# Patient Record
Sex: FEMALE | Race: BLACK OR AFRICAN AMERICAN | Employment: UNEMPLOYED | URBAN - METROPOLITAN AREA
[De-identification: names, ages, dates, MRNs, and addresses within clinical notes are randomized per-mention and may not be internally consistent; named-entity substitution may affect disease eponyms.]

---

## 2018-04-13 LAB — HBA1C MFR BLD HPLC: 6.1 %

## 2018-11-09 LAB
CREAT ?TM UR-SCNC: 153 UMOL/L
EXT MICROALBUMIN URINE RANDOM: 250
HBA1C MFR BLD HPLC: 6.1 %
MICROALBUMIN/CREAT UR: 1634 MG/G{CREAT}

## 2018-11-15 ENCOUNTER — TELEPHONE (OUTPATIENT)
Dept: NEPHROLOGY | Facility: CLINIC | Age: 63
End: 2018-11-15

## 2018-11-15 NOTE — TELEPHONE ENCOUNTER
Called and left message on machine on 11/14 and 11/15/2018 for patient to return our call for a Nephrology Consult Appointment

## 2018-11-19 ENCOUNTER — TELEPHONE (OUTPATIENT)
Dept: NEPHROLOGY | Facility: CLINIC | Age: 63
End: 2018-11-19

## 2018-11-19 NOTE — TELEPHONE ENCOUNTER
Called and left a message on machine for patient to return our call for a Nephrology Consult Appointment

## 2018-11-29 ENCOUNTER — TELEPHONE (OUTPATIENT)
Dept: NEPHROLOGY | Facility: CLINIC | Age: 63
End: 2018-11-29

## 2018-11-29 NOTE — TELEPHONE ENCOUNTER
Called and left a message on machine 11/19 @ 2:07pm and 11/29/2018 @ 1:42pm for patient to return our call for a Nephrology Consult appointment  I did call Dr Nellie Ariza office and spoke to Bobby and Bobby said that a letter was mailed out to the patient about making an appointment with Nephrologist  Dr Elvira Esparza office aware of not being able to get a hold of the patient

## 2018-12-06 ENCOUNTER — TELEPHONE (OUTPATIENT)
Dept: NEPHROLOGY | Facility: CLINIC | Age: 63
End: 2018-12-06

## 2018-12-06 NOTE — TELEPHONE ENCOUNTER
Patient called into the office to speak to [place] [id number] she said she had some messages from her as I was transferring the patient to [place] [id number] she hung up  Rosaura tried calling her back 5 times 12/6/2018 no answer

## 2018-12-06 NOTE — TELEPHONE ENCOUNTER
Patient called and I offered her appointment with Dr Geoffrey Fajardo and Dr Mason Lagos, but patient refused appoinment during the Holiday Time, because she is in the middle of moving and is having family in for the [employer]  Patient wanted to wait until Dr Cuauhtemoc Mcadams came back from vacation  I told the patient that Dr Cuauhtemoc Mcadams won't be back until the end of January and the patient said that she wants to go into February

## 2018-12-07 ENCOUNTER — TRANSCRIBE ORDERS (OUTPATIENT)
Dept: NEPHROLOGY | Facility: CLINIC | Age: 63
End: 2018-12-07

## 2018-12-07 DIAGNOSIS — R80.9 PROTEINURIA, UNSPECIFIED TYPE: Primary | ICD-10-CM

## 2019-01-28 ENCOUNTER — TELEPHONE (OUTPATIENT)
Dept: NEPHROLOGY | Facility: CLINIC | Age: 64
End: 2019-01-28

## 2019-01-28 NOTE — TELEPHONE ENCOUNTER
LMOM for patient to call the office we need need to collect information from the patient  Home phone number [phone] is not in service

## 2019-02-08 ENCOUNTER — CONSULT (OUTPATIENT)
Dept: NEPHROLOGY | Facility: CLINIC | Age: 64
End: 2019-02-08
Payer: COMMERCIAL

## 2019-02-08 VITALS
HEART RATE: 68 BPM | DIASTOLIC BLOOD PRESSURE: 80 MMHG | RESPIRATION RATE: 16 BRPM | TEMPERATURE: 98.6 F | SYSTOLIC BLOOD PRESSURE: 146 MMHG | WEIGHT: 171.38 LBS | BODY MASS INDEX: 31.54 KG/M2 | HEIGHT: 62 IN

## 2019-02-08 DIAGNOSIS — K21.00 GASTRO-ESOPHAGEAL REFLUX DISEASE WITH ESOPHAGITIS: ICD-10-CM

## 2019-02-08 DIAGNOSIS — R80.9 PROTEINURIA, UNSPECIFIED TYPE: ICD-10-CM

## 2019-02-08 DIAGNOSIS — E11.9 TYPE 2 DIABETES MELLITUS WITH HEMOGLOBIN A1C GOAL OF LESS THAN 7.0% (HCC): Primary | ICD-10-CM

## 2019-02-08 DIAGNOSIS — I10 ESSENTIAL HYPERTENSION: ICD-10-CM

## 2019-02-08 DIAGNOSIS — E66.9 OBESITY, CLASS I, BMI 30.0-34.9 (SEE ACTUAL BMI): ICD-10-CM

## 2019-02-08 PROCEDURE — 99204 OFFICE O/P NEW MOD 45 MIN: CPT | Performed by: INTERNAL MEDICINE

## 2019-02-08 PROCEDURE — 3066F NEPHROPATHY DOC TX: CPT | Performed by: INTERNAL MEDICINE

## 2019-02-08 RX ORDER — FOLIC ACID/MULTIVIT,IRON,MINER .4-18-35
1 TABLET,CHEWABLE ORAL DAILY
COMMUNITY

## 2019-02-08 RX ORDER — LISINOPRIL 40 MG/1
20 TABLET ORAL DAILY
COMMUNITY

## 2019-02-08 RX ORDER — HYDROCHLOROTHIAZIDE 25 MG/1
25 TABLET ORAL DAILY
COMMUNITY

## 2019-02-08 RX ORDER — ASPIRIN 81 MG/1
81 TABLET ORAL DAILY
COMMUNITY

## 2019-02-08 RX ORDER — METFORMIN HYDROCHLORIDE 500 MG/1
500 TABLET, FILM COATED, EXTENDED RELEASE ORAL 2 TIMES DAILY WITH MEALS
COMMUNITY

## 2019-02-08 RX ORDER — METOPROLOL SUCCINATE 25 MG/1
25 TABLET, EXTENDED RELEASE ORAL DAILY
COMMUNITY

## 2019-02-08 RX ORDER — ESCITALOPRAM OXALATE 10 MG/1
5 TABLET ORAL DAILY
COMMUNITY

## 2019-02-08 RX ORDER — DICLOFENAC POTASSIUM 50 MG/1
50 TABLET, FILM COATED ORAL 3 TIMES DAILY
COMMUNITY

## 2019-02-08 RX ORDER — ATORVASTATIN CALCIUM 40 MG/1
40 TABLET, FILM COATED ORAL DAILY
COMMUNITY

## 2019-02-08 RX ORDER — LANOLIN ALCOHOL/MO/W.PET/CERES
1 CREAM (GRAM) TOPICAL DAILY
COMMUNITY

## 2019-02-08 RX ORDER — SENNOSIDES 8.6 MG
650 CAPSULE ORAL EVERY 8 HOURS PRN
COMMUNITY

## 2019-02-08 RX ORDER — PANTOPRAZOLE SODIUM 40 MG/1
20 TABLET, DELAYED RELEASE ORAL DAILY PRN
COMMUNITY

## 2019-02-08 NOTE — ASSESSMENT & PLAN NOTE
Not very well control but she is quite emotional and this is her 1st visit so I will continue to monitor it    Control of blood pressure and kidney disease discussed with her

## 2019-02-08 NOTE — LETTER
February 8, 2019     Cammy Montanez MD  [address] [facility]  [address] [place] [id number]    Patient: Tesha Calderón   YOB: 1955   Date of Visit: 2/8/2019       Dear Dr Jen Valdes:    Thank you for referring Tesha Calderón to me for evaluation  Below are my notes for this consultation  If you have questions, please do not hesitate to call me  I look forward to following your patient along with you  Sincerely,        Kathleen Deleon MD        CC: No Recipients  Kathleen Deleon MD  2/8/2019 11:13 AM  Sign at close encounter  [address] Kristel Tong 66 y o  female MRN: [id number]    Encounter: [id number] 2/8/2019    REASON FOR VISIT: Tesha Calderón is a 66 y  o female who was referred by Cammy Montanez MD for evaluation of No chief complaint on file  [facility] HPI:    Ratna Bowen came in today for evaluation and management of proteinuria  60-year-old woman with history of hypertension and diabetes who does have history of proteinuria for few years  She was seen by 1 of the nephrologist but was not very happy to see decided to come to us  She is overall doing well  Has some back pain and small joint pain but no other complaint she is anxious and depressed after her son death and taking medicine for that  She is also taking diclofenac for back pain    She does a long history of diabetes but it is very well controlled she also history of blood pressure  Denies any history of hematuria any visual problem        REVIEW OF SYSTEMS:    Review of Systems   Constitutional: Negative for activity change, appetite change, chills, fatigue and unexpected weight change  HENT: Negative for congestion, ear discharge, sinus pain, sinus pressure and sneezing  Eyes: Negative for photophobia, pain, discharge and visual disturbance  Respiratory: Negative for apnea, cough, choking, chest tightness, shortness of breath and wheezing      Cardiovascular: Negative for chest pain, palpitations and leg swelling  Gastrointestinal: Negative for abdominal distention, abdominal pain, blood in stool and nausea  Endocrine: Negative for heat intolerance, polyphagia and polyuria  Genitourinary: Negative for difficulty urinating, flank pain, hematuria and urgency  Musculoskeletal: Positive for arthralgias and back pain  Negative for neck pain and neck stiffness  Skin: Negative for color change and wound  Allergic/Immunologic: Negative for food allergies and immunocompromised state  Neurological: Negative for dizziness, seizures, facial asymmetry and weakness  Hematological: Negative for adenopathy  Does not bruise/bleed easily  Psychiatric/Behavioral: Negative for self-injury and suicidal ideas  The patient is nervous/anxious            PAST MEDICAL HISTORY:  Past Medical History:   Diagnosis Date    Chronic kidney disease     Diabetes mellitus ([facility] [address] )     GERD (gastroesophageal reflux disease)     Hyperlipidemia     Hypertension        PAST SURGICAL HISTORY:  Past Surgical History:   Procedure Laterality Date    CARDIAC CATHETERIZATION      COLONOSCOPY  2016    HYSTERECTOMY         SOCIAL HISTORY:  History   Alcohol Use    0 6 oz/week    1 Glasses of wine per week     Comment: occasionally     History   Drug Use No     History   Smoking Status    Former Smoker    Packs/day: 0 50    Years: 30 00    Types: Cigarettes    Quit date: 2012   Smokeless Tobacco    Never Used       FAMILY HISTORY:  Family History   Problem Relation Age of Onset    Heart disease Mother     No Known Problems Father     Heart disease Sister     Heart disease Brother     Diabetes Brother        MEDICATIONS:    Current Outpatient Prescriptions:     acetaminophen (TYLENOL) 650 mg CR tablet, Take 650 mg by mouth every 8 (eight) hours as needed for mild pain, Disp: , Rfl:     aspirin (ECOTRIN LOW STRENGTH) 81 mg EC tablet, Take 81 mg by mouth daily, Disp: , Rfl:     atorvastatin (LIPITOR) 40 mg tablet, Take 40 mg by mouth daily, Disp: , Rfl:     Co-Enzyme Q-10 100 MG CAPS, Take 175 mg by mouth daily, Disp: , Rfl:     diclofenac potassium (CATAFLAM) 50 mg tablet, Take 50 mg by mouth 3 (three) times a day, Disp: , Rfl:     escitalopram (LEXAPRO) 10 mg tablet, Take 5 mg by mouth daily, Disp: , Rfl:     glucosamine-chondroitin 500-400 MG tablet, Take 1 tablet by mouth daily, Disp: , Rfl:     hydrochlorothiazide (HYDRODIURIL) 25 mg tablet, Take 25 mg by mouth daily, Disp: , Rfl:     lisinopril (ZESTRIL) 40 mg tablet, Take 40 mg by mouth daily, Disp: , Rfl:     metFORMIN (GLUMETZA) 500 MG (MOD) 24 hr tablet, Take 500 mg by mouth 2 (two) times a day with meals, Disp: , Rfl:     metoprolol succinate (TOPROL-XL) 25 mg 24 hr tablet, Take 25 mg by mouth daily, Disp: , Rfl:     multivitamin-iron-minerals-folic acid (CENTRUM) chewable tablet, Chew 1 tablet daily, Disp: , Rfl:     pantoprazole (PROTONIX) 40 mg tablet, Take 20 mg by mouth daily as needed, Disp: , Rfl:     ZOLPIDEM TARTRATE ER PO, Take 10 mg by mouth daily at bedtime as needed for sleep, Disp: , Rfl:     PHYSICAL EXAM:  Vitals:    02/08/19 1025   BP: 146/80   BP Location: Left arm   Patient Position: Sitting   Cuff Size: Large   Pulse: 68   Resp: 16   Temp: 98 6 °F (37 °C)   TempSrc: Tympanic   Weight: 77 7 kg (171 lb 6 oz)   Height: 5' 2" (1 575 m)     Body mass index is 31 34 kg/m²  Physical Exam   Constitutional: She is oriented to person, place, and time  She appears well-developed  No distress  HENT:   Head: Normocephalic and atraumatic  Right Ear: External ear normal    Left Ear: External ear normal    Mouth/Throat: Oropharynx is clear and moist    Eyes: Pupils are equal, round, and reactive to light  Conjunctivae and EOM are normal  No scleral icterus  Neck: Normal range of motion  Neck supple  No JVD present  No thyromegaly present  Cardiovascular: Normal rate, regular rhythm and normal heart sounds      No murmur heard   Pulmonary/Chest: Effort normal and breath sounds normal  No respiratory distress  She has no wheezes  She has no rales  Abdominal: Soft  Bowel sounds are normal  She exhibits no distension and no mass  There is no tenderness  Musculoskeletal: Normal range of motion  She exhibits no edema  Neurological: She is alert and oriented to person, place, and time  Skin: Skin is warm  No rash noted  Psychiatric: She has a normal mood and affect  Her behavior is normal        LAB RESULTS:  Results for orders placed or performed in visit on 11/09/18   Hemoglobin A1C   Result Value Ref Range    Hemoglobin A1C 6 1    Microalbumin / creatinine urine ratio   Result Value Ref Range    EXT Creatinine Urine 153 0     Microalbum  ,U,Random 250 0     EXTERNAL Microalb/Creat Ratio 1,634 0        ASSESSMENT and PLAN:    Proteinuria  She does have almost 1 g of protein in the urine  Her GFR is 61 making it stage II CKD  Likely etiology is diabetes at it is most common cause  I discussed everything at length with her  Pathophysiology of kidney disease also discussed with her  Asymptomatic and progressive nature of kidney disease also discussed with her  She need to lose weight  I also advised to stop diclofenac as it is nephrotoxic  Hydration discussed with her  I will do some basic workup for proteinuria including SPEP and UPEP and rule out any vasculitis  Will also get kidney ultrasound  She is on ACE-inhibitor which I will continue for renal protection and reduce protein loss though I may increase the dose based on blood pressure reading      Obesity, Class I, BMI 30 0-34 9 (see actual BMI)  She is losing weight and I encourage for that    Gastro-esophageal reflux disease with esophagitis  Overall she is stable and take PPI on occasional basis    Type 2 diabetes mellitus with hemoglobin A1c goal of less than 7 0% ([facility])  Lab Results   Component Value Date    HGBA1C 6 1 11/09/2018       No results for input(s): POCGLU in the last 72 hours  Blood Sugar Average: Last 72 hrs:     Seems to be well control with metformin which I will continue    Essential hypertension  Not very well control but she is quite emotional and this is her 1st visit so I will continue to monitor it  Control of blood pressure and kidney disease discussed with her    I discussed everything at length with her  I will see her back in 2 months after she finished all the workup  Thank you very much for the consultation I will monitor the patient with you        Portions of the record may have been created with voice recognition software  Occasional wrong word or "sound a like" substitutions may have occurred due to the inherent limitations of voice recognition software  Read the chart carefully and recognize, using context, where substitutions have occurred  If you have any questions, please contact the dictating provider

## 2019-02-08 NOTE — ASSESSMENT & PLAN NOTE
Lab Results   Component Value Date    HGBA1C 6 1 11/09/2018       No results for input(s): POCGLU in the last 72 hours      Blood Sugar Average: Last 72 hrs:     Seems to be well control with metformin which I will continue

## 2019-02-08 NOTE — PATIENT INSTRUCTIONS
Chronic Kidney Disease   AMBULATORY CARE:   Chronic kidney disease (CKD)  is the gradual and permanent loss of kidney function  Normally, the kidneys remove fluid, chemicals, and waste from your blood  These wastes are turned into urine by your kidneys  CKD may worsen over time and lead to kidney failure  Common symptoms include the following:   · Changes in how often you need to urinate    · Swelling in your arms, legs, or feet    · Shortness of breath    · Fatigue or weakness    · Bad or bitter taste in your mouth    · Nausea, vomiting, or loss of appetite  Seek care immediately if:   · You are confused and very drowsy  · You have a seizure  · You have shortness of breath  Contact your healthcare provider if:   · You suddenly gain or lose more weight than your healthcare provider has told you is okay  · You have itchy skin or a rash  · You urinate more or less than you normally do  · You have blood in your urine  · You have nausea and repeated vomiting  · You have fatigue or muscle weakness  · You have hiccups that will not stop  · You have questions or concerns about your condition or care  Treatment for CKD:  Medicines may be given to decrease blood pressure and get rid of extra fluid  You may also receive medicine to manage health conditions that may occur with CKD  Dialysis is a treatment to remove chemicals and waste from your blood when your kidneys can no longer do this  Surgery may be needed to create an arteriovenous fistula (AVF) in your arm or insert a catheter into your abdomen so that you can receive dialysis  A kidney transplant may be done if your CKD becomes severe  Manage CKD:   · Maintain a healthy weight  Ask your healthcare provider how much you should weigh  Ask him to help you create a weight loss plan if you are overweight  · Exercise 30 to 60 minutes a day, 4 to 7 times a week, or as directed  Ask about the best exercise plan for you   Regular exercise can help you manage CKD, high blood pressure, and diabetes  · Follow your healthcare provider's advice about what to eat and drink  He may tell you to eat food low in sodium (salt), potassium, phosphorus, or protein  You may need to see a dietitian if you need help planning meals  Ask how much liquid to drink each day and which liquids are best for you  · Limit alcohol  Ask how much alcohol is safe for you to drink  A drink of alcohol is 12 ounces of beer, 5 ounces of wine, or 1½ ounces of liquor  · Do not smoke  Nicotine and other chemicals in cigarettes and cigars can cause lung and kidney damage  Ask your healthcare provider for information if you currently smoke and need help to quit  E-cigarettes or smokeless tobacco still contain nicotine  Talk to your healthcare provider before you use these products  · Ask your healthcare provider if you need vaccines  Infections such as pneumonia, influenza, and hepatitis can be more harmful or more likely to occur in a person who has CKD  Vaccines reduce your risk of infection with these viruses  Follow up with your healthcare provider as directed:  Write down your questions so you remember to ask them during your visits  © 2017 [employer] Information is for End User's use only and may not be sold, redistributed or otherwise used for commercial purposes  All illustrations and images included in CareNotes® are the copyrighted property of [employer]  or [employer]  The above information is an  only  It is not intended as medical advice for individual conditions or treatments  Talk to your doctor, nurse or pharmacist before following any medical regimen to see if it is safe and effective for you

## 2019-02-08 NOTE — ASSESSMENT & PLAN NOTE
She does have almost 1 g of protein in the urine  Her GFR is 61 making it stage II CKD  Likely etiology is diabetes at it is most common cause  I discussed everything at length with her  Pathophysiology of kidney disease also discussed with her  Asymptomatic and progressive nature of kidney disease also discussed with her  She need to lose weight  I also advised to stop diclofenac as it is nephrotoxic  Hydration discussed with her  I will do some basic workup for proteinuria including SPEP and UPEP and rule out any vasculitis  Will also get kidney ultrasound  She is on ACE-inhibitor which I will continue for renal protection and reduce protein loss though I may increase the dose based on blood pressure reading

## 2019-02-08 NOTE — PROGRESS NOTES
NEPHROLOGY OFFICE CONSULT  Azucena Domínguez 66 y o  female MRN: [id number]    Encounter: [id number] 2/8/2019    REASON FOR VISIT: Valarie Lucio is a 66 y  o female who was referred by Lucia Burt MD for evaluation of No chief complaint on file  [facility] HPI:    Светлана Maloney came in today for evaluation and management of proteinuria  60-year-old woman with history of hypertension and diabetes who does have history of proteinuria for few years  She was seen by 1 of the nephrologist but was not very happy to see decided to come to us  She is overall doing well  Has some back pain and small joint pain but no other complaint she is anxious and depressed after her son death and taking medicine for that  She is also taking diclofenac for back pain    She does a long history of diabetes but it is very well controlled she also history of blood pressure  Denies any history of hematuria any visual problem        REVIEW OF SYSTEMS:    Review of Systems   Constitutional: Negative for activity change, appetite change, chills, fatigue and unexpected weight change  HENT: Negative for congestion, ear discharge, sinus pain, sinus pressure and sneezing  Eyes: Negative for photophobia, pain, discharge and visual disturbance  Respiratory: Negative for apnea, cough, choking, chest tightness, shortness of breath and wheezing  Cardiovascular: Negative for chest pain, palpitations and leg swelling  Gastrointestinal: Negative for abdominal distention, abdominal pain, blood in stool and nausea  Endocrine: Negative for heat intolerance, polyphagia and polyuria  Genitourinary: Negative for difficulty urinating, flank pain, hematuria and urgency  Musculoskeletal: Positive for arthralgias and back pain  Negative for neck pain and neck stiffness  Skin: Negative for color change and wound  Allergic/Immunologic: Negative for food allergies and immunocompromised state     Neurological: Negative for dizziness, seizures, facial asymmetry and weakness  Hematological: Negative for adenopathy  Does not bruise/bleed easily  Psychiatric/Behavioral: Negative for self-injury and suicidal ideas  The patient is nervous/anxious            PAST MEDICAL HISTORY:  Past Medical History:   Diagnosis Date    Chronic kidney disease     Diabetes mellitus ([address] )     GERD (gastroesophageal reflux disease)     Hyperlipidemia     Hypertension        PAST SURGICAL HISTORY:  Past Surgical History:   Procedure Laterality Date    CARDIAC CATHETERIZATION      COLONOSCOPY  2016    HYSTERECTOMY         SOCIAL HISTORY:  History   Alcohol Use    0 6 oz/week    1 Glasses of wine per week     Comment: occasionally     History   Drug Use No     History   Smoking Status    Former Smoker    Packs/day: 0 50    Years: 30 00    Types: Cigarettes    Quit date: 2012   Smokeless Tobacco    Never Used       FAMILY HISTORY:  Family History   Problem Relation Age of Onset    Heart disease Mother     No Known Problems Father     Heart disease Sister     Heart disease Brother     Diabetes Brother        MEDICATIONS:    Current Outpatient Prescriptions:     acetaminophen (TYLENOL) 650 mg CR tablet, Take 650 mg by mouth every 8 (eight) hours as needed for mild pain, Disp: , Rfl:     aspirin (ECOTRIN LOW STRENGTH) 81 mg EC tablet, Take 81 mg by mouth daily, Disp: , Rfl:     atorvastatin (LIPITOR) 40 mg tablet, Take 40 mg by mouth daily, Disp: , Rfl:     Co-Enzyme Q-10 100 MG CAPS, Take 175 mg by mouth daily, Disp: , Rfl:     diclofenac potassium (CATAFLAM) 50 mg tablet, Take 50 mg by mouth 3 (three) times a day, Disp: , Rfl:     escitalopram (LEXAPRO) 10 mg tablet, Take 5 mg by mouth daily, Disp: , Rfl:     glucosamine-chondroitin 500-400 MG tablet, Take 1 tablet by mouth daily, Disp: , Rfl:     hydrochlorothiazide (HYDRODIURIL) 25 mg tablet, Take 25 mg by mouth daily, Disp: , Rfl:     lisinopril (ZESTRIL) 40 mg tablet, Take 40 mg by mouth daily, Disp: , Rfl:     metFORMIN (GLUMETZA) 500 MG (MOD) 24 hr tablet, Take 500 mg by mouth 2 (two) times a day with meals, Disp: , Rfl:     metoprolol succinate (TOPROL-XL) 25 mg 24 hr tablet, Take 25 mg by mouth daily, Disp: , Rfl:     multivitamin-iron-minerals-folic acid (CENTRUM) chewable tablet, Chew 1 tablet daily, Disp: , Rfl:     pantoprazole (PROTONIX) 40 mg tablet, Take 20 mg by mouth daily as needed, Disp: , Rfl:     ZOLPIDEM TARTRATE ER PO, Take 10 mg by mouth daily at bedtime as needed for sleep, Disp: , Rfl:     PHYSICAL EXAM:  Vitals:    02/08/19 1025   BP: 146/80   BP Location: Left arm   Patient Position: Sitting   Cuff Size: Large   Pulse: 68   Resp: 16   Temp: 98 6 °F (37 °C)   TempSrc: Tympanic   Weight: 77 7 kg (171 lb 6 oz)   Height: 5' 2" (1 575 m)     Body mass index is 31 34 kg/m²  Physical Exam   Constitutional: She is oriented to person, place, and time  She appears well-developed  No distress  HENT:   Head: Normocephalic and atraumatic  Right Ear: External ear normal    Left Ear: External ear normal    Mouth/Throat: Oropharynx is clear and moist    Eyes: Pupils are equal, round, and reactive to light  Conjunctivae and EOM are normal  No scleral icterus  Neck: Normal range of motion  Neck supple  No JVD present  No thyromegaly present  Cardiovascular: Normal rate, regular rhythm and normal heart sounds  No murmur heard  Pulmonary/Chest: Effort normal and breath sounds normal  No respiratory distress  She has no wheezes  She has no rales  Abdominal: Soft  Bowel sounds are normal  She exhibits no distension and no mass  There is no tenderness  Musculoskeletal: Normal range of motion  She exhibits no edema  Neurological: She is alert and oriented to person, place, and time  Skin: Skin is warm  No rash noted  Psychiatric: She has a normal mood and affect   Her behavior is normal        LAB RESULTS:  Results for orders placed or performed in visit on 11/09/18 Hemoglobin A1C   Result Value Ref Range    Hemoglobin A1C 6 1    Microalbumin / creatinine urine ratio   Result Value Ref Range    EXT Creatinine Urine 153 0     Microalbum  ,U,Random 250 0     EXTERNAL Microalb/Creat Ratio 1,634 0        ASSESSMENT and PLAN:    Proteinuria  She does have almost 1 g of protein in the urine  Her GFR is 61 making it stage II CKD  Likely etiology is diabetes at it is most common cause  I discussed everything at length with her  Pathophysiology of kidney disease also discussed with her  Asymptomatic and progressive nature of kidney disease also discussed with her  She need to lose weight  I also advised to stop diclofenac as it is nephrotoxic  Hydration discussed with her  I will do some basic workup for proteinuria including SPEP and UPEP and rule out any vasculitis  Will also get kidney ultrasound  She is on ACE-inhibitor which I will continue for renal protection and reduce protein loss though I may increase the dose based on blood pressure reading  Obesity, Class I, BMI 30 0-34 9 (see actual BMI)  She is losing weight and I encourage for that    Gastro-esophageal reflux disease with esophagitis  Overall she is stable and take PPI on occasional basis    Type 2 diabetes mellitus with hemoglobin A1c goal of less than 7 0% ([facility])  Lab Results   Component Value Date    HGBA1C 6 1 11/09/2018       No results for input(s): POCGLU in the last 72 hours  Blood Sugar Average: Last 72 hrs:     Seems to be well control with metformin which I will continue    Essential hypertension  Not very well control but she is quite emotional and this is her 1st visit so I will continue to monitor it  Control of blood pressure and kidney disease discussed with her    I discussed everything at length with her  I will see her back in 2 months after she finished all the workup    Thank you very much for the consultation I will monitor the patient with you        Portions of the record may have been created with voice recognition software  Occasional wrong word or "sound a like" substitutions may have occurred due to the inherent limitations of voice recognition software  Read the chart carefully and recognize, using context, where substitutions have occurred  If you have any questions, please contact the dictating provider

## 2019-02-15 ENCOUNTER — HOSPITAL ENCOUNTER (OUTPATIENT)
Dept: ULTRASOUND IMAGING | Facility: HOSPITAL | Age: 64
Discharge: HOME | End: 2019-02-15
Attending: INTERNAL MEDICINE
Payer: COMMERCIAL

## 2019-02-15 ENCOUNTER — APPOINTMENT (OUTPATIENT)
Dept: LAB | Facility: HOSPITAL | Age: 64
End: 2019-02-15
Attending: INTERNAL MEDICINE
Payer: COMMERCIAL

## 2019-02-15 DIAGNOSIS — R80.9 PROTEINURIA, UNSPECIFIED TYPE: ICD-10-CM

## 2019-02-15 LAB
ALBUMIN SERPL BCP-MCNC: 3.6 G/DL (ref 3.5–5)
ANION GAP SERPL CALCULATED.3IONS-SCNC: 8 MMOL/L (ref 4–13)
BASOPHILS # BLD AUTO: 0.04 THOUSANDS/ΜL (ref 0–0.1)
BASOPHILS NFR BLD AUTO: 1 % (ref 0–1)
BUN SERPL-MCNC: 11 MG/DL (ref 5–25)
C3 SERPL-MCNC: 161 MG/DL (ref 90–180)
C4 SERPL-MCNC: 37 MG/DL (ref 10–40)
CALCIUM ALBUM COR SERPL-MCNC: 10.5 MG/DL (ref 8.3–10.1)
CALCIUM SERPL-MCNC: 10.2 MG/DL (ref 8.3–10.1)
CALCIUM SERPL-MCNC: 10.2 MG/DL (ref 8.3–10.1)
CHLORIDE SERPL-SCNC: 102 MMOL/L (ref 100–108)
CO2 SERPL-SCNC: 28 MMOL/L (ref 21–32)
CREAT SERPL-MCNC: 0.99 MG/DL (ref 0.6–1.3)
CREAT UR-MCNC: 32.2 MG/DL
EOSINOPHIL # BLD AUTO: 0.12 THOUSAND/ΜL (ref 0–0.61)
EOSINOPHIL NFR BLD AUTO: 2 % (ref 0–6)
ERYTHROCYTE [DISTWIDTH] IN BLOOD BY AUTOMATED COUNT: 13.1 % (ref 11.6–15.1)
ERYTHROCYTE [SEDIMENTATION RATE] IN BLOOD: 14 MM/HOUR (ref 0–20)
GFR SERPL CREATININE-BSD FRML MDRD: 71 ML/MIN/1.73SQ M
GLUCOSE P FAST SERPL-MCNC: 86 MG/DL (ref 65–99)
HCT VFR BLD AUTO: 35 % (ref 34.8–46.1)
HGB BLD-MCNC: 11.4 G/DL (ref 11.5–15.4)
IMM GRANULOCYTES # BLD AUTO: 0.03 THOUSAND/UL (ref 0–0.2)
IMM GRANULOCYTES NFR BLD AUTO: 0 % (ref 0–2)
LYMPHOCYTES # BLD AUTO: 2.88 THOUSANDS/ΜL (ref 0.6–4.47)
LYMPHOCYTES NFR BLD AUTO: 40 % (ref 14–44)
MCH RBC QN AUTO: 30.1 PG (ref 26.8–34.3)
MCHC RBC AUTO-ENTMCNC: 32.6 G/DL (ref 31.4–37.4)
MCV RBC AUTO: 92 FL (ref 82–98)
MONOCYTES # BLD AUTO: 0.47 THOUSAND/ΜL (ref 0.17–1.22)
MONOCYTES NFR BLD AUTO: 7 % (ref 4–12)
NEUTROPHILS # BLD AUTO: 3.7 THOUSANDS/ΜL (ref 1.85–7.62)
NEUTS SEG NFR BLD AUTO: 50 % (ref 43–75)
NRBC BLD AUTO-RTO: 0 /100 WBCS
PLATELET # BLD AUTO: 270 THOUSANDS/UL (ref 149–390)
PMV BLD AUTO: 10.1 FL (ref 8.9–12.7)
POTASSIUM SERPL-SCNC: 4.2 MMOL/L (ref 3.5–5.3)
PROT UR-MCNC: 62 MG/DL
PROT/CREAT UR: 1.93 MG/G{CREAT} (ref 0–0.1)
RBC # BLD AUTO: 3.79 MILLION/UL (ref 3.81–5.12)
SODIUM SERPL-SCNC: 138 MMOL/L (ref 136–145)
WBC # BLD AUTO: 7.24 THOUSAND/UL (ref 4.31–10.16)

## 2019-02-15 PROCEDURE — 84165 PROTEIN E-PHORESIS SERUM: CPT

## 2019-02-15 PROCEDURE — 85652 RBC SED RATE AUTOMATED: CPT

## 2019-02-15 PROCEDURE — 86430 RHEUMATOID FACTOR TEST QUAL: CPT

## 2019-02-15 PROCEDURE — 82570 ASSAY OF URINE CREATININE: CPT

## 2019-02-15 PROCEDURE — 86160 COMPLEMENT ANTIGEN: CPT

## 2019-02-15 PROCEDURE — 84166 PROTEIN E-PHORESIS/URINE/CSF: CPT

## 2019-02-15 PROCEDURE — 76770 US EXAM ABDO BACK WALL COMP: CPT

## 2019-02-15 PROCEDURE — 82040 ASSAY OF SERUM ALBUMIN: CPT

## 2019-02-15 PROCEDURE — 36415 COLL VENOUS BLD VENIPUNCTURE: CPT

## 2019-02-15 PROCEDURE — 84156 ASSAY OF PROTEIN URINE: CPT

## 2019-02-15 PROCEDURE — 80048 BASIC METABOLIC PNL TOTAL CA: CPT

## 2019-02-15 PROCEDURE — 85025 COMPLETE CBC W/AUTO DIFF WBC: CPT

## 2019-02-15 PROCEDURE — 84166 PROTEIN E-PHORESIS/URINE/CSF: CPT | Performed by: PATHOLOGY

## 2019-02-15 PROCEDURE — 86038 ANTINUCLEAR ANTIBODIES: CPT

## 2019-02-15 PROCEDURE — 84165 PROTEIN E-PHORESIS SERUM: CPT | Performed by: PATHOLOGY

## 2019-02-18 LAB
RHEUMATOID FACT SER QL LA: NEGATIVE
RYE IGE QN: NEGATIVE

## 2019-02-19 LAB
ALBUMIN SERPL ELPH-MCNC: 4.24 G/DL (ref 3.5–5)
ALBUMIN SERPL ELPH-MCNC: 59.7 % (ref 52–65)
ALBUMIN UR ELPH-MCNC: 84.5 %
ALPHA1 GLOB MFR UR ELPH: 3.7 %
ALPHA1 GLOB SERPL ELPH-MCNC: 0.28 G/DL (ref 0.1–0.4)
ALPHA1 GLOB SERPL ELPH-MCNC: 3.9 % (ref 2.5–5)
ALPHA2 GLOB MFR UR ELPH: 3.2 %
ALPHA2 GLOB SERPL ELPH-MCNC: 0.79 G/DL (ref 0.4–1.2)
ALPHA2 GLOB SERPL ELPH-MCNC: 11.1 % (ref 7–13)
B-GLOBULIN MFR UR ELPH: 3.8 %
BETA GLOB ABNORMAL SERPL ELPH-MCNC: 0.44 G/DL (ref 0.4–0.8)
BETA1 GLOB SERPL ELPH-MCNC: 6.2 % (ref 5–13)
BETA2 GLOB SERPL ELPH-MCNC: 5.4 % (ref 2–8)
BETA2+GAMMA GLOB SERPL ELPH-MCNC: 0.38 G/DL (ref 0.2–0.5)
GAMMA GLOB ABNORMAL SERPL ELPH-MCNC: 0.97 G/DL (ref 0.5–1.6)
GAMMA GLOB MFR UR ELPH: 4.8 %
GAMMA GLOB SERPL ELPH-MCNC: 13.7 % (ref 12–22)
IGG/ALB SER: 1.48 {RATIO} (ref 1.1–1.8)
PROT PATTERN SERPL ELPH-IMP: NORMAL
PROT PATTERN UR ELPH-IMP: ABNORMAL
PROT SERPL-MCNC: 7.1 G/DL (ref 6.4–8.2)
PROT UR-MCNC: 62 MG/DL

## 2019-05-08 ENCOUNTER — TELEPHONE (OUTPATIENT)
Dept: NEPHROLOGY | Facility: CLINIC | Age: 64
End: 2019-05-08

## 2019-05-10 ENCOUNTER — OFFICE VISIT (OUTPATIENT)
Dept: NEPHROLOGY | Facility: CLINIC | Age: 64
End: 2019-05-10
Payer: COMMERCIAL

## 2019-05-10 VITALS
HEART RATE: 68 BPM | SYSTOLIC BLOOD PRESSURE: 130 MMHG | RESPIRATION RATE: 16 BRPM | TEMPERATURE: 98.6 F | WEIGHT: 178 LBS | DIASTOLIC BLOOD PRESSURE: 80 MMHG | BODY MASS INDEX: 32.76 KG/M2 | HEIGHT: 62 IN

## 2019-05-10 DIAGNOSIS — R80.1 PERSISTENT PROTEINURIA: Primary | ICD-10-CM

## 2019-05-10 DIAGNOSIS — E83.52 HYPERCALCEMIA: ICD-10-CM

## 2019-05-10 DIAGNOSIS — E66.9 OBESITY, CLASS I, BMI 30.0-34.9 (SEE ACTUAL BMI): ICD-10-CM

## 2019-05-10 DIAGNOSIS — E11.9 TYPE 2 DIABETES MELLITUS WITH HEMOGLOBIN A1C GOAL OF LESS THAN 7.0% (HCC): ICD-10-CM

## 2019-05-10 DIAGNOSIS — K21.00 GASTRO-ESOPHAGEAL REFLUX DISEASE WITH ESOPHAGITIS: ICD-10-CM

## 2019-05-10 DIAGNOSIS — I10 ESSENTIAL HYPERTENSION: ICD-10-CM

## 2019-05-10 PROCEDURE — 99213 OFFICE O/P EST LOW 20 MIN: CPT | Performed by: INTERNAL MEDICINE

## 2019-05-10 PROCEDURE — 3066F NEPHROPATHY DOC TX: CPT | Performed by: INTERNAL MEDICINE

## 2019-08-02 LAB
CREAT ?TM UR-SCNC: 51.1 UMOL/L
EXT PROTEIN URINE: 13.2
PROT/CREAT UR: 0.26 MG/G{CREAT}

## 2019-08-07 ENCOUNTER — TELEPHONE (OUTPATIENT)
Dept: OTHER | Facility: HOSPITAL | Age: 64
End: 2019-08-07

## 2019-08-07 DIAGNOSIS — N17.9 AKI (ACUTE KIDNEY INJURY) (HCC): Primary | ICD-10-CM

## 2019-08-07 LAB
25(OH)D3 SERPL-MCNC: 20 NG/ML (ref 30–100)
BUN SERPL-MCNC: 34 MG/DL (ref 5–25)
CALCIUM SERPL-MCNC: 10.8 MG/DL (ref 8.3–10.1)
CREAT ?TM UR-SCNC: 51.1 UMOL/L
CREAT SERPL-MCNC: 1.34 MG/DL (ref 0.6–1.3)
EXT BLOOD, UA: ABNORMAL
EXT DIFF-ABS BASOPHILS: 0.1
EXT DIFF-ABS EOSINOPHILS: 0.1
EXT DIFF-ABS LYMPHOCYTES: 3.5
EXT DIFF-ABS MONOCYTES: 0.5
EXT DIFF-ABS NEUTROPHILS: 4.4
EXT GLUCOSE BLD: 90
EXT GLUCOSE, UA: ABNORMAL
EXT KETONES: ABNORMAL
EXT NITRITE, UA: ABNORMAL
EXT PH, UA: 5
EXT PROTEIN URINE: 13.2
EXT PROTEIN, UA: ABNORMAL
EXT SPECIFIC GRAVITY, UA: 1.01
EXTERNAL ALBUMIN: 4.1
EXTERNAL ALK PHOS: 70
EXTERNAL ALT: 59
EXTERNAL ANION GAP: 5
EXTERNAL AST: 29
EXTERNAL BACTERIA (UA): ABNORMAL
EXTERNAL BICARBONATE: 29
EXTERNAL CHLORIDE: 103
EXTERNAL EGFR: 43
EXTERNAL HEMATOCRIT: 35 %
EXTERNAL HEMOGLOBIN: 11.4 G/DL
EXTERNAL MCV: 91
EXTERNAL PLATELET COUNT: 306 K/ΜL
EXTERNAL POTASSIUM: 3.9
EXTERNAL PTH: 91.3
EXTERNAL RBC (UA): ABNORMAL
EXTERNAL RBC: 3.83
EXTERNAL RDW: 13.7
EXTERNAL SODIUM: 137
EXTERNAL T.BILIRUBIN: 0.7
EXTERNAL TOTAL PROTEIN: 29
EXTERNAL WBC (UA): ABNORMAL
EXTERNAL WBC: 8.6
PROT/CREAT UR: 0.26 MG/G{CREAT}
SQUAMOUS EPITHELIAL CELLS (HISTORICAL): >10
WBC # BLD EST: 25 10*3/UL

## 2019-08-21 ENCOUNTER — TELEPHONE (OUTPATIENT)
Dept: NEPHROLOGY | Facility: CLINIC | Age: 64
End: 2019-08-21

## 2019-08-21 NOTE — TELEPHONE ENCOUNTER
Called and left a message on machine for patient confirming her appointment for Friday 8/23/2019 3 month follow up  I also reminded the patient that she needs to have blood work done for this appointment   Adan Mcgrath,

## 2019-08-23 ENCOUNTER — OFFICE VISIT (OUTPATIENT)
Dept: NEPHROLOGY | Facility: CLINIC | Age: 64
End: 2019-08-23
Payer: COMMERCIAL

## 2019-08-23 ENCOUNTER — APPOINTMENT (OUTPATIENT)
Dept: LAB | Facility: HOSPITAL | Age: 64
End: 2019-08-23
Attending: INTERNAL MEDICINE
Payer: COMMERCIAL

## 2019-08-23 VITALS
BODY MASS INDEX: 32.02 KG/M2 | HEART RATE: 78 BPM | HEIGHT: 62 IN | TEMPERATURE: 97.1 F | RESPIRATION RATE: 16 BRPM | DIASTOLIC BLOOD PRESSURE: 70 MMHG | WEIGHT: 174 LBS | SYSTOLIC BLOOD PRESSURE: 120 MMHG

## 2019-08-23 DIAGNOSIS — R80.1 PERSISTENT PROTEINURIA: ICD-10-CM

## 2019-08-23 DIAGNOSIS — E83.52 HYPERCALCEMIA: Primary | ICD-10-CM

## 2019-08-23 DIAGNOSIS — I10 ESSENTIAL HYPERTENSION: ICD-10-CM

## 2019-08-23 DIAGNOSIS — N17.9 AKI (ACUTE KIDNEY INJURY) (HCC): ICD-10-CM

## 2019-08-23 DIAGNOSIS — E11.9 TYPE 2 DIABETES MELLITUS WITH HEMOGLOBIN A1C GOAL OF LESS THAN 7.0% (HCC): ICD-10-CM

## 2019-08-23 LAB
ALBUMIN SERPL BCP-MCNC: 4 G/DL (ref 3.5–5)
ANION GAP SERPL CALCULATED.3IONS-SCNC: 7 MMOL/L (ref 4–13)
BUN SERPL-MCNC: 29 MG/DL (ref 5–25)
CALCIUM ALBUM COR SERPL-MCNC: 10.8 MG/DL (ref 8.3–10.1)
CALCIUM SERPL-MCNC: 10.8 MG/DL (ref 8.3–10.1)
CALCIUM SERPL-MCNC: 10.8 MG/DL (ref 8.3–10.1)
CHLORIDE SERPL-SCNC: 104 MMOL/L (ref 100–108)
CO2 SERPL-SCNC: 31 MMOL/L (ref 21–32)
CREAT SERPL-MCNC: 1.31 MG/DL (ref 0.6–1.3)
GFR SERPL CREATININE-BSD FRML MDRD: 51 ML/MIN/1.73SQ M
GLUCOSE SERPL-MCNC: 77 MG/DL (ref 65–140)
POTASSIUM SERPL-SCNC: 4.4 MMOL/L (ref 3.5–5.3)
SODIUM SERPL-SCNC: 142 MMOL/L (ref 136–145)

## 2019-08-23 PROCEDURE — 82040 ASSAY OF SERUM ALBUMIN: CPT

## 2019-08-23 PROCEDURE — 80048 BASIC METABOLIC PNL TOTAL CA: CPT

## 2019-08-23 PROCEDURE — 3074F SYST BP LT 130 MM HG: CPT | Performed by: INTERNAL MEDICINE

## 2019-08-23 PROCEDURE — 99213 OFFICE O/P EST LOW 20 MIN: CPT | Performed by: INTERNAL MEDICINE

## 2019-08-23 PROCEDURE — 36415 COLL VENOUS BLD VENIPUNCTURE: CPT

## 2019-08-23 NOTE — PROGRESS NOTES
NEPHROLOGY OFFICE FOLLOW UP  Azucena Boles 66 y o  female MRN: [id number]    Encounter: [id number] 8/23/2019    REASON FOR VISIT: Joya Mckeon is a 66 y o  female who is here on 8/23/2019 for Follow-up    HPI:    Matt Kaur came in today for follow-up of CKD stage 3  She is doing very well  Has proteinuria  Recently she had a fall and complaining quite a bit of pain in her right hip for which she is taking diclofenac    No other complaint no chest pain no palpitation or shortness of Breath       REVIEW OF SYSTEMS:    Review of Systems   Constitutional: Negative for activity change and fatigue  HENT: Negative for congestion and ear discharge  Eyes: Negative for photophobia and pain  Respiratory: Negative for apnea, choking, chest tightness and shortness of breath  Cardiovascular: Negative for chest pain, palpitations and leg swelling  Gastrointestinal: Negative for abdominal distention, abdominal pain and blood in stool  Endocrine: Negative for heat intolerance, polyphagia and polyuria  Genitourinary: Negative for decreased urine volume, difficulty urinating, flank pain and urgency  Musculoskeletal: Positive for arthralgias  Negative for neck pain and neck stiffness  Skin: Negative for color change and wound  Allergic/Immunologic: Negative for food allergies and immunocompromised state  Neurological: Negative for seizures and facial asymmetry  Hematological: Negative for adenopathy  Does not bruise/bleed easily  Psychiatric/Behavioral: Negative for self-injury and suicidal ideas           PAST MEDICAL HISTORY:  Past Medical History:   Diagnosis Date    Arthritis     Chronic kidney disease     Diabetes mellitus ([address] )     GERD (gastroesophageal reflux disease)     Hyperlipidemia     Hypertension        PAST SURGICAL HISTORY:  Past Surgical History:   Procedure Laterality Date    CARDIAC CATHETERIZATION      COLONOSCOPY  2016    HYSTERECTOMY         SOCIAL HISTORY:  Social History     Substance and Sexual Activity   Alcohol Use Yes    Alcohol/week: 1 0 standard drinks    Types: 1 Glasses of wine per week    Comment: occasionally     Social History     Substance and Sexual Activity   Drug Use No     Social History     Tobacco Use   Smoking Status Former Smoker    Packs/day: 0 50    Years: 30 00    Pack years: 15 00    Types: Cigarettes    Last attempt to quit:     Years since quittin 6   Smokeless Tobacco Never Used       FAMILY HISTORY:  Family History   Problem Relation Age of Onset    Heart disease Mother     No Known Problems Father     Heart disease Sister     Stroke Sister     Heart disease Brother     Diabetes Brother        MEDICATIONS:    Current Outpatient Medications:     acetaminophen (TYLENOL) 650 mg CR tablet, Take 650 mg by mouth every 8 (eight) hours as needed for mild pain, Disp: , Rfl:     aspirin (ECOTRIN LOW STRENGTH) 81 mg EC tablet, Take 81 mg by mouth daily, Disp: , Rfl:     atorvastatin (LIPITOR) 40 mg tablet, Take 40 mg by mouth daily, Disp: , Rfl:     Co-Enzyme Q-10 100 MG CAPS, Take 175 mg by mouth daily, Disp: , Rfl:     diclofenac potassium (CATAFLAM) 50 mg tablet, Take 50 mg by mouth 3 (three) times a day, Disp: , Rfl:     escitalopram (LEXAPRO) 10 mg tablet, Take 5 mg by mouth daily, Disp: , Rfl:     glucosamine-chondroitin 500-400 MG tablet, Take 1 tablet by mouth daily, Disp: , Rfl:     hydrochlorothiazide (HYDRODIURIL) 25 mg tablet, Take 25 mg by mouth daily, Disp: , Rfl:     lisinopril (ZESTRIL) 40 mg tablet, Take 20 mg by mouth daily , Disp: , Rfl:     metFORMIN (GLUMETZA) 500 MG (MOD) 24 hr tablet, Take 500 mg by mouth 2 (two) times a day with meals, Disp: , Rfl:     metoprolol succinate (TOPROL-XL) 25 mg 24 hr tablet, Take 25 mg by mouth daily, Disp: , Rfl:     multivitamin-iron-minerals-folic acid (CENTRUM) chewable tablet, Chew 1 tablet daily, Disp: , Rfl:     pantoprazole (PROTONIX) 40 mg tablet, Take 20 mg by mouth daily as needed, Disp: , Rfl:     ZOLPIDEM TARTRATE ER PO, Take 10 mg by mouth daily at bedtime as needed for sleep, Disp: , Rfl:     PHYSICAL EXAM:  Vitals:    08/23/19 1023   BP: 120/70   BP Location: Right arm   Patient Position: Sitting   Pulse: 78   Resp: 16   Temp: (!) 97 1 °F (36 2 °C)   TempSrc: Tympanic   Weight: 78 9 kg (174 lb)   Height: 5' 2" (1 575 m)     Body mass index is 31 83 kg/m²  Physical Exam   Constitutional: She is oriented to person, place, and time  She appears well-developed  No distress  HENT:   Head: Normocephalic  Mouth/Throat: Oropharynx is clear and moist    Eyes: Conjunctivae are normal  No scleral icterus  Neck: Normal range of motion  Neck supple  No JVD present  Cardiovascular: Normal rate, regular rhythm and normal heart sounds  Pulmonary/Chest: Effort normal and breath sounds normal  She has no wheezes  Abdominal: Soft  Bowel sounds are normal  There is no tenderness  Musculoskeletal: Normal range of motion  She exhibits no edema  Neurological: She is alert and oriented to person, place, and time  Skin: Skin is warm  No rash noted  Psychiatric: She has a normal mood and affect  Her behavior is normal        LAB RESULTS:  Results for orders placed or performed in visit on 08/07/19   Comprehensive metabolic panel   Result Value Ref Range    SODIUM 137     POTASSIUM 3 9     CHLORIDE 103     BICARB 29     ANION GAP 5     BUN 34 (A) 5 - 25 mg/dL    Creatinine 1 34 (A) 0 60 - 1 30 mg/dL    Glucose 90     Calcium 10 8 (A) 8 3 - 10 1 mg/dL    TOTAL PROTEIN 29     ALBUMIN 4 1     AST 29     ALT 59     ALK PHOS 70     EXTERNAL TOT  BILIRUBIN 0 7     EXTERNAL EGFR 43    Protein / creatinine ratio, urine   Result Value Ref Range    PROTEIN UA 13 2     EXT Creatinine Urine 51 1     EXTERNAL Ur Prot/Creat Ratio 0 26    Urinalysis with microscopic   Result Value Ref Range    Spec Grav, UA (Ref:1 003-1 030) 1  006     Glucose, UA (Ref: Negative) neg     Ketones, UA (Ref: Negative) neg     Blood, UA 0 20-0 99     Nitrite, UA (Ref: Negative) neg      Leukocytes, UA (Ref: Negative) 25     pH, UA (Ref: 4 5-8 0) 5 0     Protein, UA (Ref: Negative) neg     RBC, UA 3-5     EXTERNAL WBC, UA 0-2     EXTERNAL BACTERIA, UA few     SQUAMOUS EPITHELIAL CELLS >10    Vitamin D 1,25 dihydroxy   Result Value Ref Range    Vit D, 25-Hydroxy 20 0 (A) 30 0 - 100 0 ng/mL   CBC and differential   Result Value Ref Range    WBC 8 6     External RBC 3 83     External Hemoglobin 11 4 g/dL    Hematocrit 35 %    MCV 91     External RDW 13 7     External Platelets 488 K/µL    Abs Neutrophils 4 4     Abs Lymphocytes 3 5     External Abs Monocytes  0 5     External Abs Eosinophils 0 1     External Abs Basophils  0 1    PTH, intact   Result Value Ref Range    PTH 91 3        ASSESSMENT and PLAN:      DENNY (acute kidney injury) ([facility] [address] )  Her kidney function is deteriorated  She was advised to drink lots of liquid and cut down lisinopril to half the dose  She is also taking diclofenac which may be contributing  I advised to stop diclofenac  I will repeat blood test today to assess effect of hydration    Essential hypertension  Blood pressure is very well control    Hypercalcemia  Calcium is running high  Her vitamin-D level is normal PTH is 90 which is borderline  I discuss diet at length with her    Proteinuria  It is much better a  It may be a function of the reduced GFR  Will continue to monitor      I discussed all her lab work at length with her  Diet also discussed with her  I will see her back in 3 months and will get blood test before that visit        Portions of the record may have been created with voice recognition software  Occasional wrong word or "sound a like" substitutions may have occurred due to the inherent limitations of voice recognition software  Read the chart carefully and recognize, using context, where substitutions have occurred  If you have any questions, please contact the dictating provider

## 2019-08-23 NOTE — PATIENT INSTRUCTIONS
Chronic Kidney Disease   AMBULATORY CARE:   Chronic kidney disease (CKD)  is the gradual and permanent loss of kidney function  Normally, the kidneys remove fluid, chemicals, and waste from your blood  These wastes are turned into urine by your kidneys  CKD may worsen over time and lead to kidney failure  Common symptoms include the following:   · Changes in how often you need to urinate    · Swelling in your arms, legs, or feet    · Shortness of breath    · Fatigue or weakness    · Bad or bitter taste in your mouth    · Nausea, vomiting, or loss of appetite  Seek care immediately if:   · You are confused and very drowsy  · You have a seizure  · You have shortness of breath  Contact your healthcare provider if:   · You suddenly gain or lose more weight than your healthcare provider has told you is okay  · You have itchy skin or a rash  · You urinate more or less than you normally do  · You have blood in your urine  · You have nausea and repeated vomiting  · You have fatigue or muscle weakness  · You have hiccups that will not stop  · You have questions or concerns about your condition or care  Treatment for CKD:  Medicines may be given to decrease blood pressure and get rid of extra fluid  You may also receive medicine to manage health conditions that may occur with CKD  Dialysis is a treatment to remove chemicals and waste from your blood when your kidneys can no longer do this  Surgery may be needed to create an arteriovenous fistula (AVF) in your arm or insert a catheter into your abdomen so that you can receive dialysis  A kidney transplant may be done if your CKD becomes severe  Manage CKD:   · Maintain a healthy weight  Ask your healthcare provider how much you should weigh  Ask him to help you create a weight loss plan if you are overweight  · Exercise 30 to 60 minutes a day, 4 to 7 times a week, or as directed  Ask about the best exercise plan for you   Regular exercise can help you manage CKD, high blood pressure, and diabetes  · Follow your healthcare provider's advice about what to eat and drink  He may tell you to eat food low in sodium (salt), potassium, phosphorus, or protein  You may need to see a dietitian if you need help planning meals  Ask how much liquid to drink each day and which liquids are best for you  · Limit alcohol  Ask how much alcohol is safe for you to drink  A drink of alcohol is 12 ounces of beer, 5 ounces of wine, or 1½ ounces of liquor  · Do not smoke  Nicotine and other chemicals in cigarettes and cigars can cause lung and kidney damage  Ask your healthcare provider for information if you currently smoke and need help to quit  E-cigarettes or smokeless tobacco still contain nicotine  Talk to your healthcare provider before you use these products  · Ask your healthcare provider if you need vaccines  Infections such as pneumonia, influenza, and hepatitis can be more harmful or more likely to occur in a person who has CKD  Vaccines reduce your risk of infection with these viruses  Follow up with your healthcare provider as directed:  Write down your questions so you remember to ask them during your visits  © 2017 [employer] Information is for End User's use only and may not be sold, redistributed or otherwise used for commercial purposes  All illustrations and images included in CareNotes® are the copyrighted property of [employer]  or [employer]  The above information is an  only  It is not intended as medical advice for individual conditions or treatments  Talk to your doctor, nurse or pharmacist before following any medical regimen to see if it is safe and effective for you

## 2019-08-23 NOTE — ASSESSMENT & PLAN NOTE
Calcium is running high  Her vitamin-D level is normal PTH is 90 which is borderline    I discuss diet at length with her

## 2019-08-23 NOTE — ASSESSMENT & PLAN NOTE
Her kidney function is deteriorated  She was advised to drink lots of liquid and cut down lisinopril to half the dose  She is also taking diclofenac which may be contributing  I advised to stop diclofenac    I will repeat blood test today to assess effect of hydration

## 2019-11-04 ENCOUNTER — TELEPHONE (OUTPATIENT)
Dept: NEPHROLOGY | Facility: CLINIC | Age: 64
End: 2019-11-04

## 2019-11-04 NOTE — TELEPHONE ENCOUNTER
LMOM for Azucena her appointment dated 12/6/19 @9:00am was canceled due to Dr Crystal Noble not being in the office  Azucena appointment was reschedule for 12/24/19 @9:15am a letter was mailed with the new date along with the orders of blood work for the 12/24/19 appointment    Xavi Frannie mr

## 2019-12-11 DIAGNOSIS — R80.1 PERSISTENT PROTEINURIA: ICD-10-CM

## 2019-12-11 DIAGNOSIS — E83.52 HYPERCALCEMIA: Primary | ICD-10-CM

## 2020-03-19 DIAGNOSIS — E83.52 HYPERCALCEMIA: Primary | ICD-10-CM

## 2020-03-25 ENCOUNTER — TELEPHONE (OUTPATIENT)
Dept: NEPHROLOGY | Facility: CLINIC | Age: 65
End: 2020-03-25

## 2020-03-25 NOTE — TELEPHONE ENCOUNTER
Patient of Dr Tammie Hagen called stating that she wanted to cancel her appointment for Friday 3/27/2020 3 month follow with Dr Tammie Hagen because she did not have her blood work done because the patient did not want to go to the hospital because of the [address] [place] [address]  The patient did reschedule her appointment for 6/19/2020  The patient understood and was okay with her appointment time and day   Giselle Velez,

## 2020-05-11 ENCOUNTER — TELEPHONE (OUTPATIENT)
Dept: NEPHROLOGY | Facility: CLINIC | Age: 65
End: 2020-05-11

## 2020-05-12 ENCOUNTER — TELEPHONE (OUTPATIENT)
Dept: NEPHROLOGY | Facility: CLINIC | Age: 65
End: 2020-05-12

## 2020-05-13 ENCOUNTER — TELEPHONE (OUTPATIENT)
Dept: NEPHROLOGY | Facility: CLINIC | Age: 65
End: 2020-05-13

## 2020-05-26 ENCOUNTER — TELEPHONE (OUTPATIENT)
Dept: NEPHROLOGY | Facility: CLINIC | Age: 65
End: 2020-05-26

## 2021-01-06 ENCOUNTER — VBI (OUTPATIENT)
Dept: ADMINISTRATIVE | Facility: OTHER | Age: 66
End: 2021-01-06

## 2021-02-03 ENCOUNTER — VBI (OUTPATIENT)
Dept: ADMINISTRATIVE | Facility: OTHER | Age: 66
End: 2021-02-03

## 2024-01-22 ENCOUNTER — LAB REQUISITION (OUTPATIENT)
Dept: LAB | Facility: HOSPITAL | Age: 69
End: 2024-01-22

## 2024-01-22 DIAGNOSIS — H18.513 ENDOTHELIAL CORNEAL DYSTROPHY, BILATERAL: ICD-10-CM

## 2024-01-22 PROCEDURE — 87205 SMEAR GRAM STAIN: CPT | Performed by: OPHTHALMOLOGY

## 2024-01-22 PROCEDURE — 87070 CULTURE OTHR SPECIMN AEROBIC: CPT | Performed by: OPHTHALMOLOGY

## 2024-01-22 PROCEDURE — 87075 CULTR BACTERIA EXCEPT BLOOD: CPT | Performed by: OPHTHALMOLOGY

## 2024-01-22 PROCEDURE — 87102 FUNGUS ISOLATION CULTURE: CPT | Performed by: OPHTHALMOLOGY

## 2024-01-25 LAB
BACTERIA EYE AEROBE CULT: NO GROWTH
BACTERIA SPEC ANAEROBE CULT: NO GROWTH
GRAM STN SPEC: NORMAL

## 2024-01-29 LAB — FUNGUS SPEC CULT: NORMAL

## 2024-02-05 LAB — FUNGUS SPEC CULT: NORMAL

## 2024-02-12 LAB — FUNGUS SPEC CULT: NORMAL

## 2024-02-19 LAB — FUNGUS SPEC CULT: NORMAL

## 2024-02-26 LAB — FUNGUS SPEC CULT: NORMAL
